# Patient Record
Sex: FEMALE | Race: BLACK OR AFRICAN AMERICAN | NOT HISPANIC OR LATINO | Employment: FULL TIME | URBAN - METROPOLITAN AREA
[De-identification: names, ages, dates, MRNs, and addresses within clinical notes are randomized per-mention and may not be internally consistent; named-entity substitution may affect disease eponyms.]

---

## 2017-10-02 ENCOUNTER — ANESTHESIA EVENT (OUTPATIENT)
Dept: SURGERY | Facility: HOSPITAL | Age: 32
End: 2017-10-02
Payer: COMMERCIAL

## 2017-10-02 ENCOUNTER — HOSPITAL ENCOUNTER (OUTPATIENT)
Facility: HOSPITAL | Age: 32
LOS: 1 days | Discharge: HOME | End: 2017-10-02
Attending: EMERGENCY MEDICINE | Admitting: EMERGENCY MEDICINE
Payer: COMMERCIAL

## 2017-10-02 ENCOUNTER — ANESTHESIA (OUTPATIENT)
Dept: SURGERY | Facility: HOSPITAL | Age: 32
End: 2017-10-02
Payer: COMMERCIAL

## 2017-10-02 VITALS
WEIGHT: 213 LBS | DIASTOLIC BLOOD PRESSURE: 70 MMHG | SYSTOLIC BLOOD PRESSURE: 105 MMHG | HEART RATE: 80 BPM | TEMPERATURE: 99 F | HEIGHT: 64 IN | BODY MASS INDEX: 36.37 KG/M2 | OXYGEN SATURATION: 100 % | RESPIRATION RATE: 18 BRPM

## 2017-10-02 DIAGNOSIS — O00.101 RIGHT TUBAL PREGNANCY WITHOUT INTRAUTERINE PREGNANCY: Primary | ICD-10-CM

## 2017-10-02 DIAGNOSIS — R10.9 ABDOMINAL PAIN: ICD-10-CM

## 2017-10-02 PROBLEM — K66.1 RUPTURED RIGHT TUBAL ECTOPIC PREGNANCY CAUSING HEMOPERITONEUM: Status: ACTIVE | Noted: 2017-10-02

## 2017-10-02 LAB
ABO + RH BLD: NORMAL
ALBUMIN SERPL BCP-MCNC: 3.6 G/DL
ALP SERPL-CCNC: 68 U/L
ALT SERPL W/O P-5'-P-CCNC: 10 U/L
ANION GAP SERPL CALC-SCNC: 7 MMOL/L
AST SERPL-CCNC: 13 U/L
B-HCG UR QL: POSITIVE
B-HCG UR QL: POSITIVE
BACTERIA #/AREA URNS HPF: NORMAL /HPF
BASOPHILS # BLD AUTO: 0.01 K/UL
BASOPHILS NFR BLD: 0.1 %
BILIRUB SERPL-MCNC: 0.5 MG/DL
BILIRUB UR QL STRIP: NEGATIVE
BLD GP AB SCN CELLS X3 SERPL QL: NORMAL
BUN SERPL-MCNC: 7 MG/DL
CALCIUM SERPL-MCNC: 8.9 MG/DL
CHLORIDE SERPL-SCNC: 105 MMOL/L
CLARITY UR: CLEAR
CO2 SERPL-SCNC: 24 MMOL/L
COLOR UR: YELLOW
CREAT SERPL-MCNC: 0.7 MG/DL
CTP QC/QA: YES
CTP QC/QA: YES
DIFFERENTIAL METHOD: ABNORMAL
EOSINOPHIL # BLD AUTO: 0 K/UL
EOSINOPHIL NFR BLD: 0.1 %
ERYTHROCYTE [DISTWIDTH] IN BLOOD BY AUTOMATED COUNT: 13 %
EST. GFR  (AFRICAN AMERICAN): >60 ML/MIN/1.73 M^2
EST. GFR  (NON AFRICAN AMERICAN): >60 ML/MIN/1.73 M^2
GLUCOSE SERPL-MCNC: 113 MG/DL
GLUCOSE UR QL STRIP: NEGATIVE
HCG INTACT+B SERPL-ACNC: 1326 MIU/ML
HCT VFR BLD AUTO: 37.7 %
HGB BLD-MCNC: 12.3 G/DL
HGB UR QL STRIP: ABNORMAL
KETONES UR QL STRIP: NEGATIVE
LEUKOCYTE ESTERASE UR QL STRIP: NEGATIVE
LYMPHOCYTES # BLD AUTO: 1.9 K/UL
LYMPHOCYTES NFR BLD: 15.2 %
MCH RBC QN AUTO: 29.1 PG
MCHC RBC AUTO-ENTMCNC: 32.6 G/DL
MCV RBC AUTO: 89 FL
MICROSCOPIC COMMENT: NORMAL
MONOCYTES # BLD AUTO: 0.8 K/UL
MONOCYTES NFR BLD: 6.6 %
NEUTROPHILS # BLD AUTO: 9.8 K/UL
NEUTROPHILS NFR BLD: 77.8 %
NITRITE UR QL STRIP: NEGATIVE
PH UR STRIP: 7 [PH] (ref 5–8)
PLATELET # BLD AUTO: 316 K/UL
PMV BLD AUTO: 10.2 FL
POTASSIUM SERPL-SCNC: 3.7 MMOL/L
PROT SERPL-MCNC: 7.1 G/DL
PROT UR QL STRIP: NEGATIVE
RBC # BLD AUTO: 4.23 M/UL
RBC #/AREA URNS HPF: 2 /HPF (ref 0–4)
SODIUM SERPL-SCNC: 136 MMOL/L
SP GR UR STRIP: <=1.005 (ref 1–1.03)
SQUAMOUS #/AREA URNS HPF: 2 /HPF
URN SPEC COLLECT METH UR: ABNORMAL
UROBILINOGEN UR STRIP-ACNC: NEGATIVE EU/DL
WBC # BLD AUTO: 12.53 K/UL
WBC #/AREA URNS HPF: 1 /HPF (ref 0–5)

## 2017-10-02 PROCEDURE — 86850 RBC ANTIBODY SCREEN: CPT

## 2017-10-02 PROCEDURE — G0378 HOSPITAL OBSERVATION PER HR: HCPCS

## 2017-10-02 PROCEDURE — 84702 CHORIONIC GONADOTROPIN TEST: CPT

## 2017-10-02 PROCEDURE — 25000003 PHARM REV CODE 250: Performed by: NURSE PRACTITIONER

## 2017-10-02 PROCEDURE — 37000008 HC ANESTHESIA 1ST 15 MINUTES: Performed by: SPECIALIST

## 2017-10-02 PROCEDURE — 63600175 PHARM REV CODE 636 W HCPCS

## 2017-10-02 PROCEDURE — 36000709 HC OR TIME LEV III EA ADD 15 MIN: Performed by: SPECIALIST

## 2017-10-02 PROCEDURE — 80053 COMPREHEN METABOLIC PANEL: CPT

## 2017-10-02 PROCEDURE — 37000009 HC ANESTHESIA EA ADD 15 MINS: Performed by: SPECIALIST

## 2017-10-02 PROCEDURE — 88305 TISSUE EXAM BY PATHOLOGIST: CPT | Performed by: PATHOLOGY

## 2017-10-02 PROCEDURE — 81025 URINE PREGNANCY TEST: CPT | Performed by: EMERGENCY MEDICINE

## 2017-10-02 PROCEDURE — 99285 EMERGENCY DEPT VISIT HI MDM: CPT | Mod: 25

## 2017-10-02 PROCEDURE — 96360 HYDRATION IV INFUSION INIT: CPT

## 2017-10-02 PROCEDURE — 63600175 PHARM REV CODE 636 W HCPCS: Performed by: SPECIALIST

## 2017-10-02 PROCEDURE — 86901 BLOOD TYPING SEROLOGIC RH(D): CPT

## 2017-10-02 PROCEDURE — 63600175 PHARM REV CODE 636 W HCPCS: Performed by: ANESTHESIOLOGY

## 2017-10-02 PROCEDURE — 81000 URINALYSIS NONAUTO W/SCOPE: CPT

## 2017-10-02 PROCEDURE — 88305 TISSUE EXAM BY PATHOLOGIST: CPT | Mod: 26,,, | Performed by: PATHOLOGY

## 2017-10-02 PROCEDURE — 86900 BLOOD TYPING SEROLOGIC ABO: CPT

## 2017-10-02 PROCEDURE — 71000033 HC RECOVERY, INTIAL HOUR: Performed by: SPECIALIST

## 2017-10-02 PROCEDURE — 63600175 PHARM REV CODE 636 W HCPCS: Performed by: NURSE ANESTHETIST, CERTIFIED REGISTERED

## 2017-10-02 PROCEDURE — 96361 HYDRATE IV INFUSION ADD-ON: CPT

## 2017-10-02 PROCEDURE — 71000039 HC RECOVERY, EACH ADD'L HOUR: Performed by: SPECIALIST

## 2017-10-02 PROCEDURE — 85025 COMPLETE CBC W/AUTO DIFF WBC: CPT

## 2017-10-02 PROCEDURE — 25000003 PHARM REV CODE 250: Performed by: NURSE ANESTHETIST, CERTIFIED REGISTERED

## 2017-10-02 PROCEDURE — 36000708 HC OR TIME LEV III 1ST 15 MIN: Performed by: SPECIALIST

## 2017-10-02 PROCEDURE — 27201423 OPTIME MED/SURG SUP & DEVICES STERILE SUPPLY: Performed by: SPECIALIST

## 2017-10-02 RX ORDER — ONDANSETRON 2 MG/ML
4 INJECTION INTRAMUSCULAR; INTRAVENOUS DAILY PRN
Status: DISCONTINUED | OUTPATIENT
Start: 2017-10-02 | End: 2017-10-02 | Stop reason: HOSPADM

## 2017-10-02 RX ORDER — LIDOCAINE HCL/PF 100 MG/5ML
SYRINGE (ML) INTRAVENOUS
Status: DISCONTINUED | OUTPATIENT
Start: 2017-10-02 | End: 2017-10-02

## 2017-10-02 RX ORDER — PROPOFOL 10 MG/ML
VIAL (ML) INTRAVENOUS
Status: DISCONTINUED | OUTPATIENT
Start: 2017-10-02 | End: 2017-10-02

## 2017-10-02 RX ORDER — DIPHENHYDRAMINE HYDROCHLORIDE 50 MG/ML
25 INJECTION INTRAMUSCULAR; INTRAVENOUS EVERY 4 HOURS PRN
Status: DISCONTINUED | OUTPATIENT
Start: 2017-10-02 | End: 2017-10-03 | Stop reason: HOSPADM

## 2017-10-02 RX ORDER — HYDROMORPHONE HYDROCHLORIDE 2 MG/ML
0.2 INJECTION, SOLUTION INTRAMUSCULAR; INTRAVENOUS; SUBCUTANEOUS EVERY 5 MIN PRN
Status: DISCONTINUED | OUTPATIENT
Start: 2017-10-02 | End: 2017-10-02 | Stop reason: HOSPADM

## 2017-10-02 RX ORDER — ONDANSETRON 2 MG/ML
INJECTION INTRAMUSCULAR; INTRAVENOUS
Status: DISCONTINUED | OUTPATIENT
Start: 2017-10-02 | End: 2017-10-02

## 2017-10-02 RX ORDER — OXYCODONE AND ACETAMINOPHEN 5; 325 MG/1; MG/1
1 TABLET ORAL
Status: DISCONTINUED | OUTPATIENT
Start: 2017-10-02 | End: 2017-10-02 | Stop reason: HOSPADM

## 2017-10-02 RX ORDER — DEXAMETHASONE SODIUM PHOSPHATE 4 MG/ML
INJECTION, SOLUTION INTRA-ARTICULAR; INTRALESIONAL; INTRAMUSCULAR; INTRAVENOUS; SOFT TISSUE
Status: DISCONTINUED | OUTPATIENT
Start: 2017-10-02 | End: 2017-10-02

## 2017-10-02 RX ORDER — HYDROMORPHONE HYDROCHLORIDE 2 MG/ML
0.4 INJECTION, SOLUTION INTRAMUSCULAR; INTRAVENOUS; SUBCUTANEOUS EVERY 5 MIN PRN
Status: DISCONTINUED | OUTPATIENT
Start: 2017-10-02 | End: 2017-10-02 | Stop reason: HOSPADM

## 2017-10-02 RX ORDER — ACETAMINOPHEN 10 MG/ML
INJECTION, SOLUTION INTRAVENOUS
Status: DISCONTINUED | OUTPATIENT
Start: 2017-10-02 | End: 2017-10-02

## 2017-10-02 RX ORDER — NEOSTIGMINE METHYLSULFATE 1 MG/ML
INJECTION, SOLUTION INTRAVENOUS
Status: DISCONTINUED | OUTPATIENT
Start: 2017-10-02 | End: 2017-10-02

## 2017-10-02 RX ORDER — HYDROMORPHONE HYDROCHLORIDE 2 MG/ML
INJECTION, SOLUTION INTRAMUSCULAR; INTRAVENOUS; SUBCUTANEOUS
Status: COMPLETED
Start: 2017-10-02 | End: 2017-10-02

## 2017-10-02 RX ORDER — HYDROCODONE BITARTRATE AND ACETAMINOPHEN 5; 325 MG/1; MG/1
1 TABLET ORAL EVERY 4 HOURS PRN
Status: DISCONTINUED | OUTPATIENT
Start: 2017-10-02 | End: 2017-10-03 | Stop reason: HOSPADM

## 2017-10-02 RX ORDER — SUCCINYLCHOLINE CHLORIDE 20 MG/ML
INJECTION INTRAMUSCULAR; INTRAVENOUS
Status: DISCONTINUED | OUTPATIENT
Start: 2017-10-02 | End: 2017-10-02

## 2017-10-02 RX ORDER — FENTANYL CITRATE 50 UG/ML
INJECTION, SOLUTION INTRAMUSCULAR; INTRAVENOUS
Status: DISCONTINUED | OUTPATIENT
Start: 2017-10-02 | End: 2017-10-02

## 2017-10-02 RX ORDER — GLYCOPYRROLATE 0.2 MG/ML
INJECTION INTRAMUSCULAR; INTRAVENOUS
Status: DISCONTINUED | OUTPATIENT
Start: 2017-10-02 | End: 2017-10-02

## 2017-10-02 RX ORDER — ROCURONIUM BROMIDE 10 MG/ML
INJECTION, SOLUTION INTRAVENOUS
Status: DISCONTINUED | OUTPATIENT
Start: 2017-10-02 | End: 2017-10-02

## 2017-10-02 RX ORDER — HYDROMORPHONE HYDROCHLORIDE 2 MG/ML
INJECTION, SOLUTION INTRAMUSCULAR; INTRAVENOUS; SUBCUTANEOUS
Status: DISCONTINUED | OUTPATIENT
Start: 2017-10-02 | End: 2017-10-02

## 2017-10-02 RX ORDER — DIPHENHYDRAMINE HCL 25 MG
25 CAPSULE ORAL EVERY 4 HOURS PRN
Status: DISCONTINUED | OUTPATIENT
Start: 2017-10-02 | End: 2017-10-03 | Stop reason: HOSPADM

## 2017-10-02 RX ORDER — ONDANSETRON 8 MG/1
8 TABLET, ORALLY DISINTEGRATING ORAL EVERY 8 HOURS PRN
Status: DISCONTINUED | OUTPATIENT
Start: 2017-10-02 | End: 2017-10-03 | Stop reason: HOSPADM

## 2017-10-02 RX ORDER — SODIUM CHLORIDE 9 MG/ML
1000 INJECTION, SOLUTION INTRAVENOUS
Status: COMPLETED | OUTPATIENT
Start: 2017-10-02 | End: 2017-10-02

## 2017-10-02 RX ADMIN — HYDROMORPHONE HYDROCHLORIDE 0.4 MG: 2 INJECTION, SOLUTION INTRAMUSCULAR; INTRAVENOUS; SUBCUTANEOUS at 03:10

## 2017-10-02 RX ADMIN — HYDROMORPHONE HYDROCHLORIDE 0.4 MG: 2 INJECTION INTRAMUSCULAR; INTRAVENOUS; SUBCUTANEOUS at 03:10

## 2017-10-02 RX ADMIN — SUCCINYLCHOLINE CHLORIDE 200 MG: 20 INJECTION, SOLUTION INTRAMUSCULAR; INTRAVENOUS at 02:10

## 2017-10-02 RX ADMIN — ACETAMINOPHEN 1000 MG: 10 INJECTION, SOLUTION INTRAVENOUS at 02:10

## 2017-10-02 RX ADMIN — FENTANYL CITRATE 100 MCG: 50 INJECTION, SOLUTION INTRAMUSCULAR; INTRAVENOUS at 02:10

## 2017-10-02 RX ADMIN — DEXAMETHASONE SODIUM PHOSPHATE 8 MG: 4 INJECTION, SOLUTION INTRAMUSCULAR; INTRAVENOUS at 02:10

## 2017-10-02 RX ADMIN — DIPHENHYDRAMINE HYDROCHLORIDE 25 MG: 50 INJECTION, SOLUTION INTRAMUSCULAR; INTRAVENOUS at 03:10

## 2017-10-02 RX ADMIN — NEOSTIGMINE METHYLSULFATE 4 MG: 1 INJECTION INTRAVENOUS at 03:10

## 2017-10-02 RX ADMIN — HYDROMORPHONE HYDROCHLORIDE 0.4 MG: 2 INJECTION, SOLUTION INTRAMUSCULAR; INTRAVENOUS; SUBCUTANEOUS at 04:10

## 2017-10-02 RX ADMIN — SODIUM CHLORIDE: 0.9 INJECTION, SOLUTION INTRAVENOUS at 02:10

## 2017-10-02 RX ADMIN — SODIUM CHLORIDE 1000 ML: 0.9 INJECTION, SOLUTION INTRAVENOUS at 10:10

## 2017-10-02 RX ADMIN — ROCURONIUM BROMIDE 25 MG: 10 INJECTION, SOLUTION INTRAVENOUS at 02:10

## 2017-10-02 RX ADMIN — DEXTROSE 3 G: 50 INJECTION, SOLUTION INTRAVENOUS at 02:10

## 2017-10-02 RX ADMIN — GLYCOPYRROLATE 3 MG: 0.2 INJECTION, SOLUTION INTRAMUSCULAR; INTRAVENOUS at 03:10

## 2017-10-02 RX ADMIN — ROCURONIUM BROMIDE 5 MG: 10 INJECTION, SOLUTION INTRAVENOUS at 02:10

## 2017-10-02 RX ADMIN — PROPOFOL 200 MG: 10 INJECTION, EMULSION INTRAVENOUS at 02:10

## 2017-10-02 RX ADMIN — ONDANSETRON 4 MG: 2 INJECTION, SOLUTION INTRAMUSCULAR; INTRAVENOUS at 03:10

## 2017-10-02 RX ADMIN — LIDOCAINE HYDROCHLORIDE 80 MG: 20 INJECTION, SOLUTION INTRAVENOUS at 02:10

## 2017-10-02 RX ADMIN — HYDROMORPHONE HYDROCHLORIDE 0.8 MG: 2 INJECTION INTRAMUSCULAR; INTRAVENOUS; SUBCUTANEOUS at 03:10

## 2017-10-02 NOTE — ED PROVIDER NOTES
"Encounter Date: 10/2/2017       History     Chief Complaint   Patient presents with    Abdominal Pain     32 year old female presents to ed cc of rlq/llq abdominal pain that began at 0145 this morning. Patient states she is 4 weeks pregnant and noticed pink spotting after wiping herself.      31yo female presents to the ED for evaluation of abdominal pain.  The patient is about 4 weeks pregnant,  A0.  She has not established herself with OB care yet.  She reports that around 145 this morning, she developed suprapubic and right lower quadrant abdominal pain, which is gradually been getting worse.  She feels the pain is "like gas pain."  This morning, she noticed some "pink spotting" when she wiped after urination.  She denies trauma, fever/chills, constipation.  She has vomited 3 times (nonbilious and nonbloody), and has had about 8 episodes of nonbloody diarrhea.  She denies known sick contacts.  She is uncertain what makes the pain worse or better.  No dysuria.      The history is provided by the patient.   Abdominal Pain   The current episode started yesterday. The onset of the illness was gradual. The problem has been gradually worsening. The abdominal pain is located in the suprapubic region and RLQ. The abdominal pain does not radiate. The severity of the abdominal pain is 9/10. The abdominal pain is relieved by nothing. The abdominal pain is exacerbated by movement. The other symptoms of the illness include nausea, vomiting, diarrhea and vaginal bleeding. The other symptoms of the illness do not include fever, shortness of breath, dysuria or vaginal discharge.   The diarrhea began yesterday. The diarrhea is watery. The diarrhea occurs 5 - 10 times per day.     Nausea began yesterday. The nausea is associated with eating. The nausea is exacerbated by motion.   Vaginal bleeding was first noticed today. Vaginal bleeding other than menses is a new problem. Vaginal bleeding is unchanged since it began. " Vaginal bleeding occurred 1 time. Blood was noticed on tissue. The quantity of blood was equivalent to spotting.   The vomiting began yesterday. Vomiting occurs 2 to 5 times per day. The emesis contains stomach contents.   The patient states that she believes she is currently pregnant. The patient has not had a change in bowel habit. Symptoms associated with the illness do not include chills, anorexia, urgency, hematuria, frequency or back pain. Significant associated medical issues do not include GERD or inflammatory bowel disease.     Review of patient's allergies indicates:  No Known Allergies  Past Medical History:   Diagnosis Date    GERD (gastroesophageal reflux disease)      Past Surgical History:   Procedure Laterality Date    TONSILLECTOMY       History reviewed. No pertinent family history.  Social History   Substance Use Topics    Smoking status: Never Smoker    Smokeless tobacco: Not on file    Alcohol use No     Review of Systems   Constitutional: Positive for appetite change. Negative for chills and fever.   HENT: Negative for congestion.    Respiratory: Negative for cough and shortness of breath.    Gastrointestinal: Positive for abdominal pain, diarrhea, nausea and vomiting. Negative for anorexia and blood in stool.   Endocrine: Negative for polydipsia and polyphagia.   Genitourinary: Positive for vaginal bleeding. Negative for dysuria, frequency, hematuria, urgency and vaginal discharge.   Musculoskeletal: Negative for back pain.   Skin: Negative for rash and wound.   Allergic/Immunologic: Negative for immunocompromised state.   Neurological: Negative for dizziness and weakness.   Hematological: Does not bruise/bleed easily.   Psychiatric/Behavioral: Negative for confusion.   All other systems reviewed and are negative.      Physical Exam     Initial Vitals [10/02/17 0915]   BP Pulse Resp Temp SpO2   126/73 (!) 123 20 98.4 °F (36.9 °C) 100 %      MAP       90.67         Physical Exam    Nursing  note and vitals reviewed.  Constitutional: Vital signs are normal. She appears well-developed and well-nourished. She is Obese . She is active and cooperative. She is easily aroused.  Non-toxic appearance. She does not have a sickly appearance. She does not appear ill. No distress.   HENT:   Head: Normocephalic and atraumatic.   Mouth/Throat: Uvula is midline and oropharynx is clear and moist.   Eyes: Conjunctivae and EOM are normal.   Neck: Normal range of motion. Neck supple.   Cardiovascular: Normal rate, regular rhythm and normal heart sounds.   Pulmonary/Chest: Effort normal and breath sounds normal.   Abdominal: Soft. Normal appearance and bowel sounds are normal. She exhibits no distension. There is tenderness (right pelvic) in the suprapubic area. There is no rigidity, no rebound, no guarding and no CVA tenderness.       Genitourinary: Uterus normal. Pelvic exam was performed with patient supine. No labial fusion. There is no rash, tenderness, lesion or injury on the right labia. There is no rash, tenderness, lesion or injury on the left labia. Cervix exhibits no motion tenderness, no discharge and no friability. Right adnexum displays tenderness. Right adnexum displays no mass and no fullness. Left adnexum displays no mass, no tenderness and no fullness. There is bleeding in the vagina. No erythema or tenderness in the vagina. No foreign body in the vagina. No signs of injury around the vagina. No vaginal discharge found.   Genitourinary Comments: Moderate amount of dark red blood.  Os closed.  Right   adnexal tenderness.  Witnessed by TJ Howard.    Neurological: She is alert, oriented to person, place, and time and easily aroused. She has normal strength. GCS eye subscore is 4. GCS verbal subscore is 5. GCS motor subscore is 6.   Skin: Skin is warm, dry and intact. Capillary refill takes less than 2 seconds. No bruising and no rash noted. No erythema.   Psychiatric: She has a normal mood and affect. Her  "speech is normal and behavior is normal. Judgment and thought content normal. Cognition and memory are normal.         ED Course   Procedures  Labs Reviewed   CBC W/ AUTO DIFFERENTIAL - Abnormal; Notable for the following:        Result Value    Gran # 9.8 (*)     Gran% 77.8 (*)     Lymph% 15.2 (*)     All other components within normal limits   COMPREHENSIVE METABOLIC PANEL - Abnormal; Notable for the following:     Glucose 113 (*)     Anion Gap 7 (*)     All other components within normal limits   URINALYSIS - Abnormal; Notable for the following:     Specific Gravity, UA <=1.005 (*)     Occult Blood UA 2+ (*)     All other components within normal limits   POCT URINE PREGNANCY - Abnormal; Notable for the following:     POC Preg Test, Ur Positive (*)     All other components within normal limits   URINALYSIS MICROSCOPIC   HCG, QUANTITATIVE, PREGNANCY   HCG, QUANTITATIVE, PREGNANCY   GROUP & RH   TYPE & SCREEN   INDIRECT ANTIGLOBULIN TEST             Medical Decision Making:   Initial Assessment:   32-year-old female  approximately 4 weeks pregnant is here for lower abdominal pain with associated vomiting and diarrhea.  No trauma, fever/chills, dysuria, vaginal discharge.  She reports "pink spotting" starting this morning.  Has not established herself with OB care for this pregnancy.  The patient appears well, nontoxic.  Vital stable.  There is tenderness to palpation in the suprapubic area.  No rebound, rigidity, or guarding.   exam reveals a moderate amount of dark red blood.  Os closed.  Right adnexal tenderness.  No visualized tissue or clots.  Differential Diagnosis:   Pregnancy- Ectopic vs IUP, threatened AB, miscarriage, UTI, anemia, gas pain  Clinical Tests:   Lab Tests: Ordered and Reviewed  Radiological Study: Ordered and Reviewed  ED Management:  Labs, IV fluids, US Transvaginal, pelvic exam  Pt declined pain meds.   Other:   I have discussed this case with another health care provider.       <> " Summary of the Discussion:  (OB)- Will take to OR.    Patient is O+ and will not require RhoGAM. US reveals ectopic with free fluid in cul-de-sac.  Pt will be taken to OR by .  Pt still declines pain meds.      Imaging Results          US OB Less Than 14 Wks with Transvaginal (xpd) (Final result)  Result time 10/02/17 12:22:39   Procedure changed from US OB Less Than 14 Wks First Gestation     Final result by Félix Marie MD (10/02/17 12:22:39)                 Impression:     1.  Suspect right adnexal tubal ectopic pregnancy.    2.  Complex fluid cul-de-sac suspicious for blood.  Clinical correlation suggested.        Electronically signed by: SHANTAL MARIE MD  Date:     10/02/17  Time:    12:22              Narrative:    Transabdominal and transvaginal imaging of pelvis.  History 6 weeks pregnancy, abdominal pain.      Anteflexion uterine fundus.  Uterus 7.7 x 4 x 4.7 CM.  Complex fluid throughout pelvis, largest collection cul-de-sac 4.3 x 2 CM.  Endometrium 1.5 CM, no products of conception uterine cavity.    Complex area right adnexa may represent contents within dilated fallopian tube with cystic solid heterogeneous opacities, no unusual vascularity on color Doppler.  No definite focal products of conception confirmed.  Right adnexa mass  4.1 x 2.3 x 3.8 CM.    Left ovary contains few small follicular cyst and already 3.3 x 2.2 x 2.1  CM.                                          Attending Attestation:     Physician Attestation Statement for NP/PA:   I have conducted a face to face encounter with this patient in addition to the NP/PA, due to NP/PA Request    Other NP/PA Attestation Additions:    History of Present Illness: AGREE   Physical Exam: AGREE:  WELL APPEARING 33 Y/O F WITH LOWER ABD PAIN.  + RLQ/SUPRAPUBIC PAIN ON PALPATION.  PT IS NOT IN ANY DISTRESS.  VITALS STABLE.    Medical Decision Making: AGREE:  EXAM, LABS AND US + FOR ECTOPIC PREGNANCY.  OB/GYN WAS CONSULTED  AND PT WILL BE ADMITTED.  PT AGREES WITH PLAN AND CONTINUES TO REFUSE PAIN MEDS                 ED Course      Clinical Impression:   The primary encounter diagnosis was Right tubal pregnancy without intrauterine pregnancy. A diagnosis of Abdominal pain was also pertinent to this visit.    Disposition:   Disposition: Admitted  Condition: Stable                        NORA Ayers  10/02/17 1249       Anabelle Jacobo MD  10/02/17 3249

## 2017-10-02 NOTE — ED TRIAGE NOTES
Pt presents to ED today c/o right lower abdominal pain, N/V/D, vaginal bleeding, and reports she is ~ 6 weeks pregnant.

## 2017-10-02 NOTE — ANESTHESIA PREPROCEDURE EVALUATION
"                                                                                                             10/02/2017  Feroz Ramirez is a 32 y.o., female with PMH GERD here with ectopic pregnancy scheduled for laparoscopic bilateral salpingectomy.    Anesthesia Evaluation    I have reviewed the Patient Summary Reports.    I have reviewed the Nursing Notes.   I have reviewed the Medications.     Review of Systems  Anesthesia Hx:  No problems with previous Anesthesia  History of prior surgery of interest to airway management or planning: ( Tonsillectomy at 6yo no issues) Family Hx of Anesthesia complications: ( Mom "slow to wake up" sometimes after anesthesia, not requiring any ICU or prolonged hospital stay)   Denies Personal Hx of Anesthesia complications.   Social:  Non-Smoker, No Alcohol Use    Hematology/Oncology:  Hematology Normal        Cardiovascular:  Cardiovascular Normal Exercise tolerance: good     Pulmonary:  Pulmonary Normal    Hepatic/GI:   GERD, well controlled    Musculoskeletal:  Musculoskeletal Normal    Neurological:  Neurology Normal    Endocrine:  Endocrine Normal        Physical Exam  General:  Obesity    Airway/Jaw/Neck:  Airway Findings: Mouth Opening: Normal Tongue: Normal  General Airway Assessment: Adult  Mallampati: I  TM Distance: Normal, at least 6 cm         Dental:  DENTAL FINDINGS: Normal   Chest/Lungs:  Chest/Lungs Findings: Clear to auscultation, Normal Respiratory Rate     Heart/Vascular:  Heart Findings: Rate: Normal  Rhythm: Regular Rhythm     Abdomen:  Abdomen Findings:  Tenderness       Mental Status:  Mental Status Findings:  Cooperative, Alert and Oriented         Recent Labs  Lab 10/02/17  1039   WBC 12.53   RBC 4.23   HGB 12.3   HCT 37.7      MCV 89   MCH 29.1   MCHC 32.6         Anesthesia Plan  Type of Anesthesia, risks & benefits discussed:  Anesthesia Type:  general  Patient's Preference:   Intra-op Monitoring Plan: standard ASA monitors  Intra-op " Monitoring Plan Comments:   Post Op Pain Control Plan: multimodal analgesia  Post Op Pain Control Plan Comments:   Induction:   IV  Beta Blocker:  Patient is not currently on a Beta-Blocker (No further documentation required).       Informed Consent: Patient understands risks and agrees with Anesthesia plan.  Questions answered. Anesthesia consent signed with patient.  ASA Score: 1  emergent   Day of Surgery Review of History & Physical: I have interviewed and examined the patient. I have reviewed the patient's H&P dated:  There are no significant changes.  H&P update referred to the surgeon.         Ready For Surgery From Anesthesia Perspective.

## 2017-10-02 NOTE — TRANSFER OF CARE
"Anesthesia Transfer of Care Note    Patient: Feroz Ramirez    Procedure(s) Performed: Procedure(s) (LRB):  SALPINGECTOMY-LAPAROSCOPIC (Right)    Patient location: PACU    Anesthesia Type: general    Transport from OR: Transported from OR on 6-10 L/min O2 by face mask with adequate spontaneous ventilation    Post pain: adequate analgesia    Post assessment: no apparent anesthetic complications and tolerated procedure well    Post vital signs: stable    Level of consciousness: awake, alert and oriented    Complications: none    Transfer of care protocol was followed      Last vitals:   Visit Vitals  /75   Pulse 97   Temp 36.9 °C (98.4 °F) (Oral)   Resp 11   Ht 5' 3.5" (1.613 m)   Wt 96.6 kg (213 lb)   SpO2 100%   BMI 37.14 kg/m²     "

## 2017-10-02 NOTE — ED NOTES
APPEARANCE: Alert, oriented and in no acute distress.  CARDIAC: Normal rate and rhythm, no murmur heard.   PERIPHERAL VASCULAR: peripheral pulses present. Normal cap refill. No edema. Warm to touch.    RESPIRATORY:Normal rate and effort, breath sounds clear bilaterally throughout chest. Respirations are equal and unlabored no obvious signs of distress.  GASTRO: soft, bowel sounds normal, no abdominal distention, RLQ pain, N/V/D x 1 day.   MUSC: Full ROM. No bony tenderness or soft tissue tenderness. No obvious deformity.  SKIN: Skin is warm and dry, normal skin turgor, mucous membranes moist.  NEURO: 5/5 strength major flexors/extensors bilaterally. Sensory intact to light touch bilaterally. [place] coma scale: eyes open spontaneously-4, oriented & converses-5, obeys commands-6. No neurological abnormalities.   MENTAL STATUS: awake, alert and aware of environment.  EYE: PERRL, both eyes: pupils brisk and reactive to light. Normal size.  ENT: EARS: no obvious drainage. NOSE: no active bleeding.   LMP 8/19/2017 6 2/7 weeks gestation. Vaginal bleeding x 1 day

## 2017-10-02 NOTE — ANESTHESIA POSTPROCEDURE EVALUATION
"Anesthesia Post Evaluation    Patient: Feroz Ramirez    Procedure(s) Performed: Procedure(s) (LRB):  SALPINGECTOMY-LAPAROSCOPIC (Right)    Final Anesthesia Type: general  Patient location during evaluation: PACU  Patient participation: Yes- Able to Participate  Level of consciousness: awake and alert  Post-procedure vital signs: reviewed and stable  Pain management: adequate  Airway patency: patent  PONV status at discharge: No PONV  Anesthetic complications: no      Cardiovascular status: blood pressure returned to baseline  Respiratory status: unassisted  Hydration status: euvolemic  Follow-up not needed.        Visit Vitals  BP (!) 122/59   Pulse 68   Temp 36.6 °C (97.9 °F) (Skin)   Resp 17   Ht 5' 3.5" (1.613 m)   Wt 96.6 kg (213 lb)   SpO2 (!) 94%   BMI 37.14 kg/m²       Pain/Papa Score: Pain Assessment Performed: Yes (10/2/2017  4:26 PM)  Presence of Pain: denies (10/2/2017  4:26 PM)  Pain Rating Prior to Med Admin: 5 (10/2/2017  4:01 PM)  Papa Score: 9 (10/2/2017  4:26 PM)      "

## 2017-10-02 NOTE — PLAN OF CARE
Patient sleeping ,easily arousable. VSS. Denies any pain, itching subsided. Report to Dolly RN with time for questions, verbalized understanding. Dr. Ackerman okay to release patient from PACU.

## 2017-10-03 PROBLEM — K66.1 RUPTURED RIGHT TUBAL ECTOPIC PREGNANCY CAUSING HEMOPERITONEUM: Status: ACTIVE | Noted: 2017-10-03

## 2017-10-03 PROBLEM — O00.101 RUPTURED RIGHT TUBAL ECTOPIC PREGNANCY CAUSING HEMOPERITONEUM: Status: ACTIVE | Noted: 2017-10-03

## 2017-10-03 NOTE — PROGRESS NOTES
1945  Patient ambulated to restrooms. Denies signs and symptoms of distress. Attempted to void but could not.       2100    Patient voided 150 ml.     2110  Discharge instructions and prescriptions given/ explain to patient.  Patient verbalized understanding of discharge instructions. Patient left unit in wheel chair @ 2110.

## 2017-10-03 NOTE — DISCHARGE SUMMARY
DATE OF ADMISSION AND DISCHARGE:  10/02/2017    HOSPITAL COURSE:  Ms. Diaz is a 32-year-old -0-1-1, who was admitted with   a ruptured right ectopic pregnancy.  She had a right salpingectomy done   laparoscopically.  She tolerated it well.  She is now tolerating a regular diet,   ambulating without difficulty and has no complaints.  Her incision is clean,   dry and intact.  She will be discharged in stable condition.    DISCHARGE DATA:  Discharged to home.    DIAGNOSIS:  Status post right salpingectomy.    CONDITION:  Stable.    DIET:  Regular.    ACTIVITY:  Frequent rest.    INSTRUCTIONS:  The patient was instructed to return to the office in 1 week and   to return or call for any problems.      LGC/HN  dd: 10/03/2017 11:11:13 (CDT)  td: 10/03/2017 12:08:21 (CDT)  Doc ID   #[id number]  Job ID #[id number]    CC:

## 2017-10-03 NOTE — H&P
DATE OF ADMISSION:  10/02/2017.    CHIEF COMPLAINT:  Lower abdominal pain mostly on the right and nausea, vomiting,   and diarrhea.    HISTORY OF PRESENT ILLNESS:  Ms. Ramirez is a 32-year-old G2, P1-0-0-1 who knew   she was pregnant  last week and was noted to have pain, which caused her to   vomit and she had diarrhea.  She had some vaginal bleeding.  She presented to   the Emergency Room complaining of this complaint, and she was noted to have an   ectopic pregnancy on ultrasound.    PAST MEDICAL HISTORY:  Negative.    PAST SURGICAL HISTORY:  She had tonsils and adenoids removed.    ALLERGIES:  She has no known drug allergies.    SOCIAL HISTORY:  She drinks alcohol occasionally.  She has no smoking.  She   works at [employer] in [place].    REVIEW OF SYSTEMS:  She has pain up to her shoulder and mostly starting on the   right side.  She has cramps.  She had some nausea and vomiting.  She last ate at   7:30 p.m. yesterday.    PHYSICAL EXAMINATION:  VITAL SIGNS:  Stable.  She is afebrile.  GENERAL:  No distress, alert, and oriented.  HEART:  Regular rate and rhythm without murmur.  CHEST:  Clear to auscultation bilaterally.  ABDOMEN:  Soft and somewhat tender in the right lower quadrant.  EXTREMITIES:  No edema.    LABORATORY DATA:  An ultrasound shows that she has a right-sided ectopic   pregnancy with fluid extending up to the liver.  Hemoglobin is 12.3, hematocrit   is 37.7.  The quantitative beta-hCG is 1326.  Her blood type is O positive,   antibody screen negative.    IMPRESSION:  Ruptured right ectopic pregnancy.    PLAN:  The plan is to do a laparoscopic removal of the tube and remove the blood   clots.  The risks, benefits and alternatives were explained to her today.      LGC/HN  dd: 10/02/2017 13:53:31 (CDT)  td: 10/02/2017 23:05:40 (CDT)  Doc ID   #[id number]  Job ID #[id number]    CC:

## 2017-10-03 NOTE — OP NOTE
DATE OF PROCEDURE:  10/02/2017    PREOPERATIVE DIAGNOSIS:  Suspected ectopic pregnancy.    POSTOPERATIVE DIAGNOSIS:  Suspected ectopic pregnancy.    PROCEDURE:  Laparoscopic removal of right tube and ectopic right salpingectomy.    SURGEON:  Lilli Ward M.D.    ASSISTANT:  None.    ANESTHESIA:  General anesthesia.    COMPLICATIONS:  None.    ESTIMATED BLOOD LOSS:  200 mL of blood clots were removed from the pelvis.  No   further bleeding was noted.    HISTORY:  Ms. Diaz is a 32-year-old -0-0-1 with a known pregnancy test at   home.  She was complaining of bleeding and pain, was seen in the Emergency Room   and was diagnosed with a possible right ectopic pregnancy that was bleeding   internally.  The risks, benefits, and alternatives of surgery were explained to   her.    PROCEDURE:  The patient was taken to the Operating Room, placed on the table in   dorsal supine position.  General anesthesia was administered per the Anesthesia   Service without complications.  She was then sterilely prepped and draped in the   usual fashion and her bladder was drained of approximately 20 mL of clear   urine.  A sponge stick was inserted into her vagina.  Attention was turned to   the abdomen.  A 1 cm infraumbilical incision was made with the scalpel.  The 10   mm trocar was inserted under direct visualization.  The abdomen was insufflated,   it was noted that there was blood in the pelvis extending upwards.  There was   an adhesion noted near the bladder.  Therefore, a 5 mm trocar was placed in the   suprapubic area slightly to the left of midline and then another 10 mm trocar   was inserted to the left lower quadrant.  The clots were suctioned.  A grasper   was used to grasp the tubes.  It appeared that the right fallopian tube was   encased and ectopic.  There was blood flowing out of the tube at the fimbria.    The tube was grasped with the grasper and the LigaSure was used to cut the tube   away from the ovary and  from the uterus.  Hemostasis was achieved.  An EndoCatch   was then placed and the tube was placed in the EndoCatch.  The EndoCatch was   closed and the bag was removed through the 10 mm trocar site.  This was removed   in toto.  The pelvis was then irrigated and all areas of dissection were noted   to be hemostatic.  More clots was removed from the posterior cul-de-sac.  The   left fallopian tube appeared to be normal.  The ovaries appeared to be normal.    The uterus was normal shape and size without fibroids.  The pelvis was   hemostatic.  The trocars were removed.  The abdomen was deflated.  The skin was   closed with Dermabond.  The patient was returned was returned to the dorsal   supine position.  The hardware was removed from her vagina and she was awakened   without incident.  She tolerated the procedure well.  Sponge, lap and needle   counts were correct x2 and she was taken to the Recovery Room awake and alert,   in stable condition.      LGC/HN  dd: 10/02/2017 17:18:06 (CDT)  td: 10/03/2017 00:39:53 (CDT)  Doc ID   #[id number]  Job ID #[id number]    CC: Lilli Ward M.D.

## 2017-10-03 NOTE — PLAN OF CARE
Problem: Patient Care Overview  Goal: Plan of Care Review  Pt received on RA , spo2 98%. Will continue to monitor.

## 2023-03-22 ENCOUNTER — OFFICE VISIT (OUTPATIENT)
Dept: URGENT CARE | Facility: CLINIC | Age: 38
End: 2023-03-22
Payer: COMMERCIAL

## 2023-03-22 VITALS
SYSTOLIC BLOOD PRESSURE: 96 MMHG | HEIGHT: 63 IN | WEIGHT: 234 LBS | RESPIRATION RATE: 18 BRPM | OXYGEN SATURATION: 98 % | HEART RATE: 78 BPM | DIASTOLIC BLOOD PRESSURE: 67 MMHG | TEMPERATURE: 99 F | BODY MASS INDEX: 41.46 KG/M2

## 2023-03-22 DIAGNOSIS — J02.9 SORE THROAT: Primary | ICD-10-CM

## 2023-03-22 LAB
CTP QC/QA: YES
CTP QC/QA: YES
POC MOLECULAR INFLUENZA A AGN: NEGATIVE
POC MOLECULAR INFLUENZA B AGN: NEGATIVE
SARS-COV-2 AG RESP QL IA.RAPID: NEGATIVE

## 2023-03-22 PROCEDURE — 87502 INFLUENZA DNA AMP PROBE: CPT | Mod: QW,S$GLB,, | Performed by: FAMILY MEDICINE

## 2023-03-22 PROCEDURE — 87811 SARS-COV-2 COVID19 W/OPTIC: CPT | Mod: QW,S$GLB,, | Performed by: FAMILY MEDICINE

## 2023-03-22 PROCEDURE — 87811 SARS CORONAVIRUS 2 ANTIGEN POCT, MANUAL READ: ICD-10-PCS | Mod: QW,S$GLB,, | Performed by: FAMILY MEDICINE

## 2023-03-22 PROCEDURE — 87502 POCT INFLUENZA A/B MOLECULAR: ICD-10-PCS | Mod: QW,S$GLB,, | Performed by: FAMILY MEDICINE

## 2023-03-22 PROCEDURE — 99213 OFFICE O/P EST LOW 20 MIN: CPT | Mod: S$GLB,,, | Performed by: FAMILY MEDICINE

## 2023-03-22 PROCEDURE — 99213 PR OFFICE/OUTPT VISIT, EST, LEVL III, 20-29 MIN: ICD-10-PCS | Mod: S$GLB,,, | Performed by: FAMILY MEDICINE

## 2023-03-22 NOTE — PROGRESS NOTES
"Subjective:       Patient ID: Feroz Ramirez is a 37 y.o. female.    Vitals:  height is 5' 3" (1.6 m) and weight is 106.1 kg (234 lb). Her oral temperature is 98.6 °F (37 °C). Her blood pressure is 96/67 and her pulse is 78. Her respiration is 18 and oxygen saturation is 98%.     Chief Complaint: Sore Throat    This is a 37 y.o. female who presents today with a chief complaint of  cough, sore throat, body aches- Sx started last night  Pt exposed to flu   Home Tx: motrin     Sore Throat   This is a new problem. The current episode started yesterday. There has been no fever. Associated symptoms include coughing and headaches. Pertinent negatives include no congestion.     HENT:  Positive for sore throat. Negative for congestion.    Respiratory:  Positive for cough.    Neurological:  Positive for headaches.     Objective:      Physical Exam   Constitutional: She does not appear ill. No distress. obesity  HENT:   Head: Normocephalic and atraumatic.   Nose: Congestion present. No rhinorrhea.   Mouth/Throat: Mucous membranes are moist. Posterior oropharyngeal erythema (mild) present. No oropharyngeal exudate.   Eyes: Pupils are equal, round, and reactive to light.   Neck: Neck supple.   Cardiovascular: Normal rate, regular rhythm, normal heart sounds and normal pulses.   Pulmonary/Chest: Effort normal and breath sounds normal.   Abdominal: Normal appearance.   Lymphadenopathy:     She has no cervical adenopathy.   Neurological: She is alert.   Nursing note and vitals reviewed.      Results for orders placed or performed in visit on 03/22/23   POCT Influenza A/B MOLECULAR   Result Value Ref Range    POC Molecular Influenza A Ag Negative Negative, Not Reported    POC Molecular Influenza B Ag Negative Negative, Not Reported     Acceptable Yes    SARS Coronavirus 2 Antigen, POCT Manual Read   Result Value Ref Range    SARS Coronavirus 2 Antigen Negative Negative     Acceptable Yes     "   Assessment:       1. Sore throat          Plan:         Sore throat  -     POCT Influenza A/B MOLECULAR  -     SARS Coronavirus 2 Antigen, POCT Manual Read    OTC analgesia recommended

## (undated) DEVICE — SEE MEDLINE ITEM 146355

## (undated) DEVICE — SYR B-D DISP CONTROL 10CC100/C

## (undated) DEVICE — TUBING INSUFFLATION 10

## (undated) DEVICE — UNDERGLOVE BIOGEL PI SZ 6.5 LF

## (undated) DEVICE — TROCAR ENDOPATH XCEL 5MM 7.5CM

## (undated) DEVICE — CATH URETHRAL 16FR RED

## (undated) DEVICE — Device

## (undated) DEVICE — ADHESIVE DERMABOND ADVANCED

## (undated) DEVICE — SEE MEDLINE ITEM 156955

## (undated) DEVICE — IRRIGATOR ENDOSCOPY DISP.

## (undated) DEVICE — APPLICATOR CHLORAPREP ORN 26ML

## (undated) DEVICE — SEE MEDLINE ITEM 146292

## (undated) DEVICE — SLEEVE SCD EXPRESS CALF LARGE

## (undated) DEVICE — SEALER LIGASURE LAP 37CM 5MM

## (undated) DEVICE — BLADE SURG CARBON STEEL SZ11

## (undated) DEVICE — SEE MEDLINE ITEM 152622

## (undated) DEVICE — UNDERGLOVES BIOGEL PI SIZE 8

## (undated) DEVICE — NDL INSUF ULTRA VERESS 120MM

## (undated) DEVICE — SEE MEDLINE ITEM 157117

## (undated) DEVICE — SEE MEDLINE ITEM 154981

## (undated) DEVICE — PACK LAPAROSCOPY TIBURON

## (undated) DEVICE — MANIFOLD 4 PORT

## (undated) DEVICE — TROCAR ENDOPATH XCEL 11MM 10CM

## (undated) DEVICE — COVER OVERHEAD SURG LT BLUE